# Patient Record
(demographics unavailable — no encounter records)

---

## 2017-08-17 NOTE — EDPHY
H & P


Stated Complaint: vaginal Bleeding, Cramping


HPI/ROS: 





HPI





CHIEF COMPLAINT:  Cramping, menstrual bleeding





HISTORY OF PRESENT ILLNESS:  This patient 46-year-old female she presents 

emergency room by private vehicle for menstrual bleeding and pelvic cramping.  

She states initially yesterday she developed some pelvic cramping.  She then 

developed vaginal bleeding today around 4:00 a.m..  Has had 3 tampons today.  

Bright red blood with some clots.  Ongoing pelvic cramping.  Denies being 

pregnant.  States she has not had a menses in 2 years.  No new medications.





Past Medical History:  No significant medical history





Past Surgical History:  Hernia repair and breast augmentation





Social History:  Denies daily use drugs alcohol tobacco products





Family History:  Noncontributory








ROS   


REVIEW OF SYSTEMS:


A comprehensive 10 point review of systems is otherwise negative aside from 

elements mentioned in the history of present illness.








Exam   


Constitutional  appears well nontoxic triage nursing summary reviewed, vital 

signs reviewed, awake/alert. 


Eyes   normal conjunctivae and sclera, EOMI, PERRLA. 


HENT   normal inspection, atraumatic, moist mucus membranes, no epistaxis, neck 

supple/ no meningismus, no raccoon eyes. 


Respiratory   clear to auscultation bilaterally, normal breath sounds, no 

respiratory distress, no wheezing. 


Cardiovascular   rate normal, regular rhythm, no murmur, no edema, distal 

pulses normal. 


Gastrointestinal   soft, non-tender, no rebound, no guarding, normal bowel 

sounds, no distension, no pulsatile mass. 


Genitourinary   no CVA tenderness. 


Musculoskeletal  no midline vertebral tenderness, full range of motion, no calf 

swelling, no tenderness of extremities, no meningismus, good pulses, 

neurovascularly intact.


Skin   pink, warm, & dry, no rash, skin atraumatic. 


Neurologic   awake, alert and oriented x 3, AAOx3, moves all 4 extremities 

equally, motor intact, sensory intact, CN II-XII intact, normal cerebellar, 

normal vision, normal speech. 


Psychiatric   normal mood/affect. 


Heme/Lymph/Immune   no lymphadenopathy.





Differential Diagnosis:  Includes but is not limited to in a particular order 

dysfunctional uterine bleeding, fibroid, AVM, ectopic pregnancy, pregnancy, 

miscarriage





Medical Decision Making:  Plan for this patient IV establishment, check blood 

work, electrolytes, CBC, pregnancy, urinalysis, pelvic ultrasound, pelvic exam.





Re-evaluation:








1553:  Pelvic exam performed. Lianne ER Tech as chaperone.  Pelvic exam 

unremarkable there is dark clotted blood posterior vault.  Otherwise 

unremarkable. No significant hemorrhaging on exam.





Ultrasound of the Pelvis  The results of the study are 14mm endometrial 

thickening.  Upper limit of normal. Otherwise unremarkable pelvic ultrasound.  

No mass no fibroids visualized..  I discussed the results of this study with 

the radiologist Dr. Sprague.








1519:  Will consult OBGYN.





1706:  I spoke with Dr. Way.  Review this patient's case in detail.  Her H&H 

are stable. She is hemodynamically stable no acute distress. Recommends 

following up they will call for follow-up appointment.  She needs endometrial 

biopsy.  I have explained this to the patient.  She is comfortable going home.  

Ultrasound, blood work reviewed.  She is not hemorrhaging here.  Blood pressure 

stable. H&H stable.


Source: Patient





- Personal History


LMP (Females 10-55): Now


Current Tetanus/Diphtheria Vaccine: Yes


Current Tetanus Diphtheria and Acellular Pertussis (TDAP): Yes





- Medical/Surgical History


Hx Asthma: No


Hx Chronic Respiratory Disease: No


Hx Diabetes: No


Hx Cardiac Disease: No


Hx Renal Disease: No


Hx Cirrhosis: No


Hx Alcoholism: No


Hx HIV/AIDS: No


Hx Splenectomy or Spleen Trauma: No


Other PMH: PMH: denies.  PSH: breast aumentation, hernia





- Social History


Smoking Status: Never smoked


Constitutional: 


 Initial Vital Signs











Temperature (C)  36.4 C   08/17/17 13:59


 


Heart Rate  60   08/17/17 13:59


 


Respiratory Rate  18   08/17/17 13:59


 


Blood Pressure  123/76 H  08/17/17 13:59


 


O2 Sat (%)  96   08/17/17 13:59








 











O2 Delivery Mode               Room Air














Allergies/Adverse Reactions: 


 





No Known Allergies Allergy (Unverified 08/17/17 14:03)


 








Home Medications: 














 Medication  Instructions  Recorded


 


NK [No Known Home Meds]  08/17/17














Medical Decision Making





- Diagnostics


Imaging Results: 


 Imaging Impressions





Pelvic/Renal Ultrasound  08/17/17 14:48


Impression:


1.  Upper-normal endometrial lining with no clear etiology for vaginal 

bleeding. Query adenomyosis.


2.  Normal ovaries. No adnexal mass or free fluid.


 


Findings discussed with Emergency Department physician, Vivek Roland MD, at 

1605 hours 8/17/2017.














- Data Points


Laboratory Results: 


 Laboratory Results





 08/17/17 15:01 





 08/17/17 15:01 





 











  08/17/17 08/17/17 08/17/17





  15:01 15:01 15:01


 


WBC      





    


 


RBC      





    


 


Hgb      





    


 


Hct      





    


 


MCV      





    


 


MCH      





    


 


MCHC      





    


 


RDW      





    


 


Plt Count      





    


 


MPV      





    


 


Neut % (Auto)      





    


 


Lymph % (Auto)      





    


 


Mono % (Auto)      





    


 


Eos % (Auto)      





    


 


Baso % (Auto)      





    


 


Nucleat RBC Rel Count      





    


 


Absolute Neuts (auto)      





    


 


Absolute Lymphs (auto)      





    


 


Absolute Monos (auto)      





    


 


Absolute Eos (auto)      





    


 


Absolute Basos (auto)      





    


 


Absolute Nucleated RBC      





    


 


Immature Gran %      





    


 


Immature Gran #      





    


 


PT      13.7 SEC SEC





     (12.0-15.0) 


 


INR      1.06 





     (0.83-1.16) 


 


APTT      25.0 SEC SEC





     (23.0-38.0) 


 


Sodium    142 mEq/L mEq/L  





    (134-144)  


 


Potassium    4.2 mEq/L mEq/L  





    (3.5-5.2)  


 


Chloride    102 mEq/L mEq/L  





    ()  


 


Carbon Dioxide    26 mEq/l mEq/l  





    (22-31)  


 


Anion Gap    14 mEq/L mEq/L  





    (8-16)  


 


BUN    22 mg/dL mg/dL  





    (7-23)  


 


Creatinine    0.9 mg/dL mg/dL  





    (0.6-1.0)  


 


Estimated GFR    > 60   





    


 


Glucose    52 mg/dL L mg/dL  





    ()  


 


Calcium    9.0 mg/dL mg/dL  





    (8.5-10.4)  


 


Beta HCG, Qual  NEGATIVE     





    


 


Urine Color      





    


 


Urine Appearance      





    


 


Urine pH      





    


 


Ur Specific Gravity      





    


 


Urine Protein      





    


 


Urine Ketones      





    


 


Urine Blood      





    


 


Urine Nitrate      





    


 


Urine Bilirubin      





    


 


Urine Urobilinogen      





    


 


Ur Leukocyte Esterase      





    


 


Urine Glucose      





    














  08/17/17 08/17/17





  15:01 15:00


 


WBC  8.33 10^3/uL 10^3/uL  





   (3.80-9.50)  


 


RBC  4.57 10^6/uL 10^6/uL  





   (4.18-5.33)  


 


Hgb  14.5 g/dL g/dL  





   (12.6-16.3)  


 


Hct  42.6 % %  





   (38.0-47.0)  


 


MCV  93.2 fL fL  





   (81.5-99.8)  


 


MCH  31.7 pg pg  





   (27.9-34.1)  


 


MCHC  34.0 g/dL g/dL  





   (32.4-36.7)  


 


RDW  13.2 % %  





   (11.5-15.2)  


 


Plt Count  212 10^3/uL 10^3/uL  





   (150-400)  


 


MPV  10.0 fL fL  





   (8.7-11.7)  


 


Neut % (Auto)  56.7 % %  





   (39.3-74.2)  


 


Lymph % (Auto)  32.5 % %  





   (15.0-45.0)  


 


Mono % (Auto)  8.9 % %  





   (4.5-13.0)  


 


Eos % (Auto)  1.2 % %  





   (0.6-7.6)  


 


Baso % (Auto)  0.6 % %  





   (0.3-1.7)  


 


Nucleat RBC Rel Count  0.0 % %  





   (0.0-0.2)  


 


Absolute Neuts (auto)  4.72 10^3/uL 10^3/uL  





   (1.70-6.50)  


 


Absolute Lymphs (auto)  2.71 10^3/uL 10^3/uL  





   (1.00-3.00)  


 


Absolute Monos (auto)  0.74 10^3/uL 10^3/uL  





   (0.30-0.80)  


 


Absolute Eos (auto)  0.10 10^3/uL 10^3/uL  





   (0.03-0.40)  


 


Absolute Basos (auto)  0.05 10^3/uL 10^3/uL  





   (0.02-0.10)  


 


Absolute Nucleated RBC  0.00 10^3/uL 10^3/uL  





   (0-0.01)  


 


Immature Gran %  0.1 % %  





   (0.0-1.1)  


 


Immature Gran #  0.01 10^3/uL 10^3/uL  





   (0.00-0.10)  


 


PT    





   


 


INR    





   


 


APTT    





   


 


Sodium    





   


 


Potassium    





   


 


Chloride    





   


 


Carbon Dioxide    





   


 


Anion Gap    





   


 


BUN    





   


 


Creatinine    





   


 


Estimated GFR    





   


 


Glucose    





   


 


Calcium    





   


 


Beta HCG, Qual    





   


 


Urine Color    COLORLESS 





   


 


Urine Appearance    CLEAR 





   


 


Urine pH    7.0 





    (5.0-7.5) 


 


Ur Specific Gravity    1.002 





    (1.002-1.030) 


 


Urine Protein    NEGATIVE 





    (NEGATIVE) 


 


Urine Ketones    NEGATIVE 





    (NEGATIVE) 


 


Urine Blood    NEGATIVE 





    (NEGATIVE) 


 


Urine Nitrate    NEGATIVE 





    (NEGATIVE) 


 


Urine Bilirubin    NEGATIVE 





    (NEGATIVE) 


 


Urine Urobilinogen    NEGATIVE EU EU





    (0.2-1.0) 


 


Ur Leukocyte Esterase    NEGATIVE 





    (NEGATIVE) 


 


Urine Glucose    NEGATIVE 





    (NEGATIVE) 











Medications Given: 


 








Discontinued Medications





Sodium Chloride (Ns)  1,000 mls @ 0 mls/hr IV EDNOW ONE; Wide Open


   PRN Reason: Protocol


   Stop: 08/17/17 14:48


   Last Admin: 08/17/17 15:15 Dose:  1,000 mls








Departure





- Departure


Disposition: Home, Routine, Self-Care


Clinical Impression: 


 DUB (dysfunctional uterine bleeding)





Condition: Good


Instructions:  Dysfunctional Uterine Bleeding (ED)


Additional Instructions: 


1. Return emergency room if you have severe vaginal bleeding this includes more 

than 1 pad per hour you feel lightheaded or have severe pain.


2. Please follow up with OBGYN outpatient.  Please call their for follow-up 

appointment.


Referrals: 


Nahomi Way MD [Medical Doctor] - As per Instructions